# Patient Record
Sex: MALE | URBAN - METROPOLITAN AREA
[De-identification: names, ages, dates, MRNs, and addresses within clinical notes are randomized per-mention and may not be internally consistent; named-entity substitution may affect disease eponyms.]

---

## 2019-07-30 ENCOUNTER — NURSE TRIAGE (OUTPATIENT)
Dept: NURSING | Facility: CLINIC | Age: 29
End: 2019-07-30

## 2019-07-31 NOTE — TELEPHONE ENCOUNTER
Bit 3x on hand by his own cat. Cat has all shots. Bites punctured skin. Bleeding under control. Advised see provider w/i 4 hrs. Will most likely need to utilize ED as it is after 10 pm.     Reason for Disposition    [1] Puncture wound (hole through the skin) from claws or teeth AND [2] cat    Additional Information    Negative: [1] Major bleeding (e.g., actively dripping or spurting) AND [2] can't be stopped    Negative: Sounds like a life-threatening emergency to the triager    Negative: Snake bite    Negative: Bite, wound, or sting from fish    Negative: [1] Any break in skin (e.g., cut, puncture or scratch) AND[2] wild animal at risk for RABIES (e.g., bat, raccoon, centeno, skunk, coyote, other carnivores)    Negative: [1] Any break in skin (e.g., cut, puncture or scratch) AND[2] dog, cat, or ferret at risk for RABIES (e.g., sick, stray, unprovoked bite, developing country)    Negative: [1] Any break in skin (e.g., cut, puncture or scratch) AND[2] monkey    Negative: [1] Cut (length > 1/8 inch or 3 mm) or skin tear AND[2] any animal    Negative: [1] Bleeding AND [2] won't stop after 10 minutes of direct pressure (using correct technique)    Negative: Description of bite sounds severe to the triager    Negative: [1] Non-bite body fluid contact (e.g., saliva, brain) AND [2] onto open cut/wound or mucous membranes AND[3] animal at high-risk for RABIES (e.g., bat, raccoon, centeno, skunk, coyote, other carnivores)    Protocols used: ANIMAL BITE-A-